# Patient Record
Sex: FEMALE | Race: WHITE | NOT HISPANIC OR LATINO | URBAN - METROPOLITAN AREA
[De-identification: names, ages, dates, MRNs, and addresses within clinical notes are randomized per-mention and may not be internally consistent; named-entity substitution may affect disease eponyms.]

---

## 2022-10-24 VITALS
RESPIRATION RATE: 16 BRPM | DIASTOLIC BLOOD PRESSURE: 76 MMHG | HEART RATE: 88 BPM | OXYGEN SATURATION: 98 % | SYSTOLIC BLOOD PRESSURE: 109 MMHG | WEIGHT: 143.3 LBS | HEIGHT: 67 IN | TEMPERATURE: 98 F

## 2022-10-24 NOTE — H&P ADULT - NSHPPHYSICALEXAM_GEN_ALL_CORE
Gen: 61F, NAD  MSK: Decreased left hip ROM secondary to pain      Rest of PE per MD clearance Gen: 61F, NAD  MSK: Decreased left hip ROM secondary to pain  Skin without erythema, ecchymosis, abrasions or lesions, signs of infection  Calves soft, nontender bilaterally   Sensation intact to light touch bilateral lower extremities  Pulses: DP2+ bilat LE; brisk capillary refill  EHL/FHL/TA/GS 5/5 bilaterally       Rest of PE per MD clearance

## 2022-10-24 NOTE — H&P ADULT - HISTORY OF PRESENT ILLNESS
61F with left hip pain x    Presents for elective left MIKE. 61F with left hip pain x 6 months. She has pain and difficulty with activity. She denies numbness or tingling in the LE. She has tried rest, time, medication, PT without relief.     Presents for elective left MIKE.

## 2022-10-24 NOTE — ASU PATIENT PROFILE, ADULT - NSICDXPASTSURGICALHX_GEN_ALL_CORE_FT
PAST SURGICAL HISTORY:  Status post breast reduction      PAST SURGICAL HISTORY:  H/O total hysterectomy     Status post breast reduction

## 2022-10-24 NOTE — H&P ADULT - PROBLEM SELECTOR PLAN 1
Admit to Orthopedic Service for elective Left MIKE    Medically cleared and optimized for surgery by Dr. De Souza Admit to Orthopedic Service for elective Left MIKE    Medically cleared and optimized for surgery by Dr. De Souza    ATTENDING ADDENDUM Dr. Faust, 11/10/22  62 y/o female here for left total hip arthroplasty  - admit for inpatient stay  - NPO, IV fluids  - IV Ancef, IV TXA, IV decadron pre-op  -  mg PO BID x6 weeks for DVT PPx

## 2022-10-24 NOTE — H&P ADULT - NSICDXPASTMEDICALHX_GEN_ALL_CORE_FT
PAST MEDICAL HISTORY:  History of ovarian cancer     Peripheral neuropathy report s/p COVID     PAST MEDICAL HISTORY:  Endometrial cancer     Peripheral neuropathy report s/p COVID    Tobacco use disorder

## 2022-10-24 NOTE — ASU PATIENT PROFILE, ADULT - FALL HARM RISK - RISK INTERVENTIONS

## 2022-10-24 NOTE — H&P ADULT - NSHPLABSRESULTS_GEN_ALL_CORE
Preop CBC, BMP, PT/INR within normal range and reviewed per medical clearance. PTT DOS  Cr- .57  Preop CXR within normal limits and reviewed per medical clearance   Preop EKG within normal limits and reviewed per medical clearance  3M: DOS  COVID: neg, 10/22

## 2022-10-24 NOTE — ASU PATIENT PROFILE, ADULT - NSICDXPASTMEDICALHX_GEN_ALL_CORE_FT
PAST MEDICAL HISTORY:  History of ovarian cancer     Peripheral neuropathy report s/p COVID     PAST MEDICAL HISTORY:  Endometrial cancer     Peripheral neuropathy report s/p COVID

## 2022-11-10 ENCOUNTER — INPATIENT (INPATIENT)
Facility: HOSPITAL | Age: 62
LOS: 0 days | Discharge: HOME | DRG: 470 | End: 2022-11-11
Attending: ORTHOPAEDIC SURGERY | Admitting: ORTHOPAEDIC SURGERY
Payer: MEDICAID

## 2022-11-10 DIAGNOSIS — G62.9 POLYNEUROPATHY, UNSPECIFIED: ICD-10-CM

## 2022-11-10 DIAGNOSIS — M16.12 UNILATERAL PRIMARY OSTEOARTHRITIS, LEFT HIP: ICD-10-CM

## 2022-11-10 DIAGNOSIS — Z90.710 ACQUIRED ABSENCE OF BOTH CERVIX AND UTERUS: Chronic | ICD-10-CM

## 2022-11-10 DIAGNOSIS — F17.200 NICOTINE DEPENDENCE, UNSPECIFIED, UNCOMPLICATED: ICD-10-CM

## 2022-11-10 DIAGNOSIS — Z85.43 PERSONAL HISTORY OF MALIGNANT NEOPLASM OF OVARY: ICD-10-CM

## 2022-11-10 DIAGNOSIS — Z98.890 OTHER SPECIFIED POSTPROCEDURAL STATES: Chronic | ICD-10-CM

## 2022-11-10 LAB — APTT BLD: 33.8 SEC — SIGNIFICANT CHANGE UP (ref 27.5–35.5)

## 2022-11-10 PROCEDURE — 72170 X-RAY EXAM OF PELVIS: CPT | Mod: 26

## 2022-11-10 DEVICE — NECK ANGLE HIP STEM: Type: IMPLANTABLE DEVICE | Status: FUNCTIONAL

## 2022-11-10 DEVICE — SHELL ACET TRIDENT II D 48MM: Type: IMPLANTABLE DEVICE | Status: FUNCTIONAL

## 2022-11-10 DEVICE — SCREW HEX LO PROF 6.5X15MM: Type: IMPLANTABLE DEVICE | Status: FUNCTIONAL

## 2022-11-10 DEVICE — MAKO BONE PIN 4MM X 110MM: Type: IMPLANTABLE DEVICE | Status: FUNCTIONAL

## 2022-11-10 DEVICE — LINER ACET TRIDENT X3 0 DEG 36MM D: Type: IMPLANTABLE DEVICE | Status: FUNCTIONAL

## 2022-11-10 DEVICE — HEAD DELTA CER V40 36 PLUS 5: Type: IMPLANTABLE DEVICE | Status: FUNCTIONAL

## 2022-11-10 DEVICE — MAKO CHECKPOINT 3.5MM HEX IMPACTION: Type: IMPLANTABLE DEVICE | Status: FUNCTIONAL

## 2022-11-10 DEVICE — SCREW HEX LO PROF 6.5X20MM: Type: IMPLANTABLE DEVICE | Status: FUNCTIONAL

## 2022-11-10 RX ORDER — POLYETHYLENE GLYCOL 3350 17 G/17G
17 POWDER, FOR SOLUTION ORAL DAILY
Refills: 0 | Status: DISCONTINUED | OUTPATIENT
Start: 2022-11-10 | End: 2022-11-11

## 2022-11-10 RX ORDER — HYDROMORPHONE HYDROCHLORIDE 2 MG/ML
0.5 INJECTION INTRAMUSCULAR; INTRAVENOUS; SUBCUTANEOUS
Refills: 0 | Status: DISCONTINUED | OUTPATIENT
Start: 2022-11-10 | End: 2022-11-11

## 2022-11-10 RX ORDER — ACETAMINOPHEN 500 MG
1000 TABLET ORAL EVERY 8 HOURS
Refills: 0 | Status: DISCONTINUED | OUTPATIENT
Start: 2022-11-10 | End: 2022-11-11

## 2022-11-10 RX ORDER — KETOROLAC TROMETHAMINE 30 MG/ML
15 SYRINGE (ML) INJECTION EVERY 8 HOURS
Refills: 0 | Status: DISCONTINUED | OUTPATIENT
Start: 2022-11-10 | End: 2022-11-11

## 2022-11-10 RX ORDER — BENZOCAINE AND MENTHOL 5; 1 G/100ML; G/100ML
1 LIQUID ORAL
Refills: 0 | Status: DISCONTINUED | OUTPATIENT
Start: 2022-11-10 | End: 2022-11-11

## 2022-11-10 RX ORDER — OXYCODONE HYDROCHLORIDE 5 MG/1
10 TABLET ORAL EVERY 4 HOURS
Refills: 0 | Status: DISCONTINUED | OUTPATIENT
Start: 2022-11-10 | End: 2022-11-11

## 2022-11-10 RX ORDER — CHLORHEXIDINE GLUCONATE 213 G/1000ML
1 SOLUTION TOPICAL ONCE
Refills: 0 | Status: COMPLETED | OUTPATIENT
Start: 2022-11-10 | End: 2022-11-10

## 2022-11-10 RX ORDER — SENNA PLUS 8.6 MG/1
2 TABLET ORAL AT BEDTIME
Refills: 0 | Status: DISCONTINUED | OUTPATIENT
Start: 2022-11-10 | End: 2022-11-11

## 2022-11-10 RX ORDER — MUPIROCIN 20 MG/G
1 OINTMENT TOPICAL
Refills: 0 | Status: DISCONTINUED | OUTPATIENT
Start: 2022-11-10 | End: 2022-11-10

## 2022-11-10 RX ORDER — PANTOPRAZOLE SODIUM 20 MG/1
40 TABLET, DELAYED RELEASE ORAL
Refills: 0 | Status: DISCONTINUED | OUTPATIENT
Start: 2022-11-10 | End: 2022-11-11

## 2022-11-10 RX ORDER — CELECOXIB 200 MG/1
200 CAPSULE ORAL ONCE
Refills: 0 | Status: COMPLETED | OUTPATIENT
Start: 2022-11-10 | End: 2022-11-10

## 2022-11-10 RX ORDER — SODIUM CHLORIDE 9 MG/ML
1000 INJECTION, SOLUTION INTRAVENOUS
Refills: 0 | Status: DISCONTINUED | OUTPATIENT
Start: 2022-11-10 | End: 2022-11-11

## 2022-11-10 RX ORDER — GABAPENTIN 400 MG/1
300 CAPSULE ORAL THREE TIMES A DAY
Refills: 0 | Status: DISCONTINUED | OUTPATIENT
Start: 2022-11-10 | End: 2022-11-11

## 2022-11-10 RX ORDER — LANOLIN ALCOHOL/MO/W.PET/CERES
5 CREAM (GRAM) TOPICAL AT BEDTIME
Refills: 0 | Status: DISCONTINUED | OUTPATIENT
Start: 2022-11-10 | End: 2022-11-11

## 2022-11-10 RX ORDER — ONDANSETRON 8 MG/1
4 TABLET, FILM COATED ORAL EVERY 6 HOURS
Refills: 0 | Status: DISCONTINUED | OUTPATIENT
Start: 2022-11-10 | End: 2022-11-11

## 2022-11-10 RX ORDER — MUPIROCIN 20 MG/G
1 OINTMENT TOPICAL
Refills: 0 | Status: DISCONTINUED | OUTPATIENT
Start: 2022-11-10 | End: 2022-11-11

## 2022-11-10 RX ORDER — OXYCODONE HYDROCHLORIDE 5 MG/1
5 TABLET ORAL EVERY 4 HOURS
Refills: 0 | Status: DISCONTINUED | OUTPATIENT
Start: 2022-11-10 | End: 2022-11-11

## 2022-11-10 RX ORDER — ACETAMINOPHEN 500 MG
1000 TABLET ORAL ONCE
Refills: 0 | Status: COMPLETED | OUTPATIENT
Start: 2022-11-10 | End: 2022-11-10

## 2022-11-10 RX ORDER — CELECOXIB 200 MG/1
200 CAPSULE ORAL DAILY
Refills: 0 | Status: DISCONTINUED | OUTPATIENT
Start: 2022-11-11 | End: 2022-11-11

## 2022-11-10 RX ORDER — DEXAMETHASONE 0.5 MG/5ML
10 ELIXIR ORAL EVERY 8 HOURS
Refills: 0 | Status: COMPLETED | OUTPATIENT
Start: 2022-11-10 | End: 2022-11-11

## 2022-11-10 RX ORDER — ASPIRIN/CALCIUM CARB/MAGNESIUM 324 MG
325 TABLET ORAL
Refills: 0 | Status: DISCONTINUED | OUTPATIENT
Start: 2022-11-11 | End: 2022-11-11

## 2022-11-10 RX ORDER — CEFAZOLIN SODIUM 1 G
2000 VIAL (EA) INJECTION EVERY 8 HOURS
Refills: 0 | Status: COMPLETED | OUTPATIENT
Start: 2022-11-10 | End: 2022-11-11

## 2022-11-10 RX ADMIN — ONDANSETRON 4 MILLIGRAM(S): 8 TABLET, FILM COATED ORAL at 22:05

## 2022-11-10 RX ADMIN — Medication 1000 MILLIGRAM(S): at 23:00

## 2022-11-10 RX ADMIN — HYDROMORPHONE HYDROCHLORIDE 0.5 MILLIGRAM(S): 2 INJECTION INTRAMUSCULAR; INTRAVENOUS; SUBCUTANEOUS at 17:00

## 2022-11-10 RX ADMIN — Medication 1000 MILLIGRAM(S): at 22:00

## 2022-11-10 RX ADMIN — HYDROMORPHONE HYDROCHLORIDE 0.5 MILLIGRAM(S): 2 INJECTION INTRAMUSCULAR; INTRAVENOUS; SUBCUTANEOUS at 18:05

## 2022-11-10 RX ADMIN — OXYCODONE HYDROCHLORIDE 10 MILLIGRAM(S): 5 TABLET ORAL at 19:26

## 2022-11-10 RX ADMIN — Medication 100 MILLIGRAM(S): at 18:33

## 2022-11-10 RX ADMIN — OXYCODONE HYDROCHLORIDE 10 MILLIGRAM(S): 5 TABLET ORAL at 18:55

## 2022-11-10 RX ADMIN — Medication 1000 MILLIGRAM(S): at 10:48

## 2022-11-10 RX ADMIN — OXYCODONE HYDROCHLORIDE 10 MILLIGRAM(S): 5 TABLET ORAL at 23:31

## 2022-11-10 RX ADMIN — MUPIROCIN 1 APPLICATION(S): 20 OINTMENT TOPICAL at 11:10

## 2022-11-10 RX ADMIN — CELECOXIB 200 MILLIGRAM(S): 200 CAPSULE ORAL at 10:48

## 2022-11-10 RX ADMIN — CHLORHEXIDINE GLUCONATE 1 APPLICATION(S): 213 SOLUTION TOPICAL at 11:09

## 2022-11-10 RX ADMIN — HYDROMORPHONE HYDROCHLORIDE 0.5 MILLIGRAM(S): 2 INJECTION INTRAMUSCULAR; INTRAVENOUS; SUBCUTANEOUS at 17:26

## 2022-11-10 RX ADMIN — HYDROMORPHONE HYDROCHLORIDE 0.5 MILLIGRAM(S): 2 INJECTION INTRAMUSCULAR; INTRAVENOUS; SUBCUTANEOUS at 16:38

## 2022-11-10 RX ADMIN — GABAPENTIN 300 MILLIGRAM(S): 400 CAPSULE ORAL at 22:00

## 2022-11-10 RX ADMIN — HYDROMORPHONE HYDROCHLORIDE 0.5 MILLIGRAM(S): 2 INJECTION INTRAMUSCULAR; INTRAVENOUS; SUBCUTANEOUS at 18:19

## 2022-11-10 RX ADMIN — CELECOXIB 200 MILLIGRAM(S): 200 CAPSULE ORAL at 11:11

## 2022-11-10 NOTE — BRIEF OPERATIVE NOTE - COMMENTS
Operative findings:  1. right hip severe degenerative osteoarthritis -- multiple, small acetabular anterosuperior cysts, 2x4 mm  2. femoral neck cut 10 mm, corrected LTC of 57 mm (femoral head size 47 mm)  3. final navigated cup angle of 42 degrees abduction, 22 degrees anteversion -- lengthened the left lower extremity 11 mm (3 mm longer than the contralateral side)  4. combine anteversion of 45 degrees    Operative implants -- [place] II size 48 mm acetabular cup, size 4 femoral stem w extended offset, 36 +5 femoral head, 2x 6.5 mm acetabular screw (15, 20 mm)

## 2022-11-10 NOTE — PRE-ANESTHESIA EVALUATION ADULT - NSANTHOSAYNRD_GEN_A_CORE
No. MEERA screening performed.  STOP BANG Legend: 0-2 = LOW Risk; 3-4 = INTERMEDIATE Risk; 5-8 = HIGH Risk

## 2022-11-11 VITALS
SYSTOLIC BLOOD PRESSURE: 108 MMHG | HEART RATE: 85 BPM | DIASTOLIC BLOOD PRESSURE: 69 MMHG | RESPIRATION RATE: 17 BRPM | OXYGEN SATURATION: 95 % | TEMPERATURE: 98 F

## 2022-11-11 LAB
ANION GAP SERPL CALC-SCNC: 8 MMOL/L — SIGNIFICANT CHANGE UP (ref 5–17)
BUN SERPL-MCNC: 7 MG/DL — SIGNIFICANT CHANGE UP (ref 7–23)
CALCIUM SERPL-MCNC: 8.5 MG/DL — SIGNIFICANT CHANGE UP (ref 8.4–10.5)
CHLORIDE SERPL-SCNC: 104 MMOL/L — SIGNIFICANT CHANGE UP (ref 96–108)
CO2 SERPL-SCNC: 27 MMOL/L — SIGNIFICANT CHANGE UP (ref 22–31)
CREAT SERPL-MCNC: 0.56 MG/DL — SIGNIFICANT CHANGE UP (ref 0.5–1.3)
EGFR: 104 ML/MIN/1.73M2 — SIGNIFICANT CHANGE UP
GLUCOSE SERPL-MCNC: 102 MG/DL — HIGH (ref 70–99)
HCT VFR BLD CALC: 30.8 % — LOW (ref 34.5–45)
HCV AB S/CO SERPL IA: 0.04 S/CO — SIGNIFICANT CHANGE UP
HCV AB SERPL-IMP: SIGNIFICANT CHANGE UP
HGB BLD-MCNC: 9.9 G/DL — LOW (ref 11.5–15.5)
MCHC RBC-ENTMCNC: 30.6 PG — SIGNIFICANT CHANGE UP (ref 27–34)
MCHC RBC-ENTMCNC: 32.1 GM/DL — SIGNIFICANT CHANGE UP (ref 32–36)
MCV RBC AUTO: 95.1 FL — SIGNIFICANT CHANGE UP (ref 80–100)
NRBC # BLD: 0 /100 WBCS — SIGNIFICANT CHANGE UP (ref 0–0)
PLATELET # BLD AUTO: 197 K/UL — SIGNIFICANT CHANGE UP (ref 150–400)
POTASSIUM SERPL-MCNC: 4.2 MMOL/L — SIGNIFICANT CHANGE UP (ref 3.5–5.3)
POTASSIUM SERPL-SCNC: 4.2 MMOL/L — SIGNIFICANT CHANGE UP (ref 3.5–5.3)
RBC # BLD: 3.24 M/UL — LOW (ref 3.8–5.2)
RBC # FLD: 12.7 % — SIGNIFICANT CHANGE UP (ref 10.3–14.5)
SODIUM SERPL-SCNC: 139 MMOL/L — SIGNIFICANT CHANGE UP (ref 135–145)
WBC # BLD: 7.57 K/UL — SIGNIFICANT CHANGE UP (ref 3.8–10.5)
WBC # FLD AUTO: 7.57 K/UL — SIGNIFICANT CHANGE UP (ref 3.8–10.5)

## 2022-11-11 RX ORDER — GABAPENTIN 400 MG/1
1 CAPSULE ORAL
Qty: 0 | Refills: 0 | DISCHARGE

## 2022-11-11 RX ADMIN — OXYCODONE HYDROCHLORIDE 10 MILLIGRAM(S): 5 TABLET ORAL at 18:32

## 2022-11-11 RX ADMIN — Medication 1000 MILLIGRAM(S): at 13:09

## 2022-11-11 RX ADMIN — Medication 1000 MILLIGRAM(S): at 05:17

## 2022-11-11 RX ADMIN — OXYCODONE HYDROCHLORIDE 10 MILLIGRAM(S): 5 TABLET ORAL at 05:17

## 2022-11-11 RX ADMIN — OXYCODONE HYDROCHLORIDE 10 MILLIGRAM(S): 5 TABLET ORAL at 11:20

## 2022-11-11 RX ADMIN — GABAPENTIN 300 MILLIGRAM(S): 400 CAPSULE ORAL at 05:18

## 2022-11-11 RX ADMIN — MUPIROCIN 1 APPLICATION(S): 20 OINTMENT TOPICAL at 17:33

## 2022-11-11 RX ADMIN — Medication 1000 MILLIGRAM(S): at 06:17

## 2022-11-11 RX ADMIN — OXYCODONE HYDROCHLORIDE 10 MILLIGRAM(S): 5 TABLET ORAL at 06:17

## 2022-11-11 RX ADMIN — Medication 325 MILLIGRAM(S): at 17:33

## 2022-11-11 RX ADMIN — Medication 325 MILLIGRAM(S): at 05:19

## 2022-11-11 RX ADMIN — OXYCODONE HYDROCHLORIDE 10 MILLIGRAM(S): 5 TABLET ORAL at 10:46

## 2022-11-11 RX ADMIN — OXYCODONE HYDROCHLORIDE 10 MILLIGRAM(S): 5 TABLET ORAL at 17:33

## 2022-11-11 RX ADMIN — Medication 1000 MILLIGRAM(S): at 14:00

## 2022-11-11 RX ADMIN — Medication 100 MILLIGRAM(S): at 05:40

## 2022-11-11 NOTE — DISCHARGE NOTE PROVIDER - NSDCFUADDINST_GEN_ALL_CORE_FT
Weight bear as tolerated with assistive device. Follow posterior hip precautions as taught to you in the hospital.  No strenuous activity, heavy lifting, driving or returning to work until cleared by MD.  You may shower - dressing is water-resistant, no soaking in bathtubs.  Remove dressing after post op day 7, then leave incision open to air. Keep incision clean and dry.  Try to have regular bowel movements, take stool softener or laxative if necessary.      Swelling may travel all the way down leg to foot, this is normal and will subside in a few weeks.  Call to schedule an appt with Dr. Faust for follow up, if you have staples or sutures they will be removed in office.  Contact your doctor if you experience: fever greater than 101.5, chills, chest pain, difficulty breathing, redness or excessive drainage around the incision, other concerns.  Follow up with your primary care provider.   Weight bear as tolerated with assistive device. Follow posterior hip precautions as taught to you in the hospital.  No strenuous activity, heavy lifting, driving or returning to work until cleared by MD.  You may shower - dressing is water-resistant, no soaking in bathtubs.  Remove dressing after post op day 7, then leave incision open to air. Keep incision clean and dry.  Try to have regular bowel movements, take stool softener or laxative if necessary.      Swelling may travel all the way down leg to foot, this is normal and will subside in a few weeks.  Call to schedule an appt with Dr. Faust for follow up, if you have staples or sutures they will be removed in office.  Contact your doctor if you experience: fever greater than 101.5, chills, chest pain, difficulty breathing, redness or excessive drainage around the incision, other concerns.  Follow up with your primary care provider.    Please take all medications as prescribed by Dr. Faust

## 2022-11-11 NOTE — OCCUPATIONAL THERAPY INITIAL EVALUATION ADULT - PHYSICAL ASSIST/NONPHYSICAL ASSIST: SCOOT/BRIDGE, REHAB EVAL
Try an over-the-counter supplement called FDgard for your stomach.  Let us know if this does not help.   supervision

## 2022-11-11 NOTE — DISCHARGE NOTE PROVIDER - NSDCCPTREATMENT_GEN_ALL_CORE_FT
PRINCIPAL PROCEDURE  Procedure: Total replacement of left hip using TA  Findings and Treatment:

## 2022-11-11 NOTE — PHYSICAL THERAPY INITIAL EVALUATION ADULT - ADDITIONAL COMMENTS
Pt resides in building with elevator with 4 BARBARA, has RW, cane and raised toilet seat. States sister is available to assist with ADL.

## 2022-11-11 NOTE — OCCUPATIONAL THERAPY INITIAL EVALUATION ADULT - RANGE OF MOTION EXAMINATION, LOWER EXTREMITY
RLE AROm, PROM at WFL, WNL; L hip and knee AROM limited (2/2 pain); ankle AROM, PROM at WFL, WNL RLE AROM, PROM at WFL, WNL; L hip and knee AROM limited (2/2 pain); ankle AROM, PROM at WFL, WNL

## 2022-11-11 NOTE — DISCHARGE NOTE NURSING/CASE MANAGEMENT/SOCIAL WORK - NSDCPEFALRISK_GEN_ALL_CORE
For information on Fall & Injury Prevention, visit: [email or web address] OR  [email or web address] OR  https://www.cdc.gov/steadi/patient.html

## 2022-11-11 NOTE — OCCUPATIONAL THERAPY INITIAL EVALUATION ADULT - ADDITIONAL COMMENTS
Pt lives w/ her sister in ground floor apt w/ ~4 stairs to enter building. Pt states that she was independent prior to admission. No prior use/possession of AEs/DMEs per Pt. Pt lives w/ her sister in ground floor apt w/ ~4 stairs to enter building. Pt states that she was independent prior to admission. Pt states that she has a cane, and raised toilet seat.

## 2022-11-11 NOTE — PROGRESS NOTE ADULT - SUBJECTIVE AND OBJECTIVE BOX
Ortho Note    Pt seen and examined this AM. Pain moderately controlled, but otherwise denies complaints.  Denies CP, SOB, N/V, numbness/tingling.     Vital Signs Last 24 Hrs  T(C): --  T(F): --  HR: 78 (11-11-22 @ 10:00) (76 - 78)  BP: 101/65 (11-11-22 @ 10:00) (101/65 - 107/70)  BP(mean): --  RR: --  SpO2: 97% (11-11-22 @ 09:20) (97% - 97%)  AVSS    General: Pt Alert and oriented, NAD  DSG- aquacel C/D/I  Pulses: +2DP, WWP feet  Sensation: SILT BLE  Motor: 5/5 EHL/FHL/TA/GS                          9.9    7.57  )-----------( 197      ( 11 Nov 2022 10:00 )             30.8     11-11    139  |  104  |  7   ----------------------------<  102<H>  4.2   |  27  |  0.56    Ca    8.5      11 Nov 2022 10:00        A/P: 61yFemale POD#1 s/p left total hip replacement, posterior approach  - VSS, labs WNL  - Pain Control  - DVT ppx: ASA 325mg bid x 6 weeks  - PT, WBS: WBAT, posterior precautions  - OOB for meals, I/S  - bowel regimen  - dispo: home with HPT pending PT clearance    Ortho Pager [phone]
Ortho Post Op Check    Procedure: L MIKE  Surgeon: Faust    Pt comfortable without complaints, pain controlled  Denies CP, SOB, N/V, numbness/tingling     Vital Signs Last 24 Hrs  T(C): 36.1 (11-10-22 @ 16:20), Max: 36.1 (11-10-22 @ 16:20)  T(F): 96.9 (11-10-22 @ 16:20), Max: 96.9 (11-10-22 @ 16:20)  HR: 52 (11-10-22 @ 19:31) (52 - 88)  BP: 114/68 (11-10-22 @ 19:31) (105/57 - 114/70)  BP(mean): 82 (11-10-22 @ 19:00) (76 - 88)  RR: 11 (11-10-22 @ 19:31) (10 - 21)  SpO2: 99% (11-10-22 @ 19:31) (93% - 100%)  I&O's Summary      Physical Exam:  General: Pt Alert and oriented, NAD  L Hip with aquacell DSG C/D/I  Pulses: 2+ dp, pt pulses, toes wwp, cap refill <3 sec  Sensation: SILT in sural/saph/sp/dp/tibial distributions b/l LE  Motor: EHL/FHL/TA/GS  firing equally b/l LE              Post-op X-Ray:  Xray L Hip: Hardware in place in appropriate alignment, no intra-op fx noted.    A/P: 61yFemale POD#0 s/p L IMKE, on 11/10  - Stable  - Pain Control  - f/u AM labs  - DVT ppx:   - Post op abx: periop ancef  - PT, WBS: WBAT  - Dispo: pending PT    Ceferino Marie, PGY-2  Ortho Pager [phone]
S: Patient seen and examined.  Had oxycodone 10 mg PO earlier this morning.  Responds appropriately.  No apparent distress    O:  L hip: mild strikethrough ASIS dressing, otherwise Aquacell c/d/i; 5/5/ EHL/FHL/GA/TA, denies sensory deficits

## 2022-11-11 NOTE — OCCUPATIONAL THERAPY INITIAL EVALUATION ADULT - GENERAL OBSERVATIONS, REHAB EVAL
Pt's RN Laura aware of intent to eval/tx; cleared Pt. Pt received in supine - +heplock, abductor pillow, L hip surgical dressing intact, b/l scds. Pt agreeable to OT

## 2022-11-11 NOTE — OCCUPATIONAL THERAPY INITIAL EVALUATION ADULT - PERSONAL SAFETY AND JUDGMENT, REHAB EVAL
impulsive!, requires cues/reminders to increase safety awareness w/ functional activities. Pt educated on LLE WBAT and posterior hip precs.

## 2022-11-11 NOTE — DISCHARGE NOTE NURSING/CASE MANAGEMENT/SOCIAL WORK - PATIENT PORTAL LINK FT
You can access the FollowMyHealth Patient Portal offered by [facility] by registering at the following website: [email or web address]. By joining [facility]’s FollowMyHealth portal, you will also be able to view your health information using other applications (apps) compatible with our system.

## 2022-11-11 NOTE — DISCHARGE NOTE PROVIDER - CARE PROVIDER_API CALL
Jarocho Faust)  Orthopaedic Surgery  [address] [address]  Phone: [phone]  Fax: [phone]  Follow Up Time: 2 weeks

## 2022-11-11 NOTE — OCCUPATIONAL THERAPY INITIAL EVALUATION ADULT - PERTINENT HX OF CURRENT PROBLEM, REHAB EVAL
61F with left hip pain x 6 months. She has pain and difficulty with activity. She denies numbness or tingling in the LE. She has tried rest, time, medication, PT without relief.

## 2022-11-11 NOTE — PROGRESS NOTE ADULT - PROBLEM SELECTOR PLAN 1
A/P: s/p L MIKE, with TA on POD #1  - WBAT L LE, posterior hip precautions  - DVT PPx:  mg PO bid x6 weeks  - pain control  - f/u AM labs  - dressing -- remove ASIS dressing tomorrow  - dispo -- pending progressing with PT, today versus tomorrow

## 2022-11-11 NOTE — CHART NOTE - NSCHARTNOTEFT_GEN_A_CORE
Patient refusing schedule PM vitals and "does not want to be disturbed until 10am". Along side DEYANIRA Estes, patient was advised about the need for vital checks epecially in the immediate perioperative period, but despite counseling, patient continues to refuse vitals. Patient refusing schedule PM vitals and "does not want to be disturbed until 10am". Along side DEYANIRA Estes, patient was advised about the need for vital checks especially in the immediate perioperative period, but despite counseling, patient continues to refuse vitals and PM meds.

## 2022-11-11 NOTE — DISCHARGE NOTE PROVIDER - HOSPITAL COURSE
Admitted 11/10/22  Surgery- left total hip replacement, posterior approach  Arely-op Antibiotics  Pain control  DVT prophylaxis  OOB/Physical Therapy

## 2022-11-11 NOTE — DISCHARGE NOTE PROVIDER - NSDCMRMEDTOKEN_GEN_ALL_CORE_FT
gabapentin 300 mg oral capsule: 1 cap(s) orally 3 times a day  naproxen 500 mg oral tablet: 1 tab(s) orally 2 times a day, As Needed

## 2022-11-11 NOTE — OCCUPATIONAL THERAPY INITIAL EVALUATION ADULT - MD ORDER
Left Hip pain 2/2 Severe degenerative Osteoarthritis  Now s/p Total replacement of left hip on 11/10/22

## 2022-11-11 NOTE — PHYSICAL THERAPY INITIAL EVALUATION ADULT - GENERAL OBSERVATIONS, REHAB EVAL
Pt received in bedin no acute distress, +abduction wedge, SCD, heplock. Pt. presents with decreased compliance with post-op precautions. Will need follow up.

## 2022-11-14 DIAGNOSIS — G62.9 POLYNEUROPATHY, UNSPECIFIED: ICD-10-CM

## 2022-11-14 DIAGNOSIS — M16.12 UNILATERAL PRIMARY OSTEOARTHRITIS, LEFT HIP: ICD-10-CM

## 2022-11-14 DIAGNOSIS — F17.200 NICOTINE DEPENDENCE, UNSPECIFIED, UNCOMPLICATED: ICD-10-CM

## 2023-03-11 PROBLEM — G62.9 POLYNEUROPATHY, UNSPECIFIED: Chronic | Status: ACTIVE | Noted: 2022-10-24

## 2023-03-11 PROBLEM — C54.1 MALIGNANT NEOPLASM OF ENDOMETRIUM: Chronic | Status: ACTIVE | Noted: 2022-11-09

## 2023-03-11 PROBLEM — F17.200 NICOTINE DEPENDENCE, UNSPECIFIED, UNCOMPLICATED: Chronic | Status: ACTIVE | Noted: 2022-11-10

## 2023-03-17 VITALS
HEART RATE: 85 BPM | TEMPERATURE: 98 F | DIASTOLIC BLOOD PRESSURE: 64 MMHG | SYSTOLIC BLOOD PRESSURE: 101 MMHG | WEIGHT: 293 LBS | RESPIRATION RATE: 16 BRPM | HEIGHT: 67.5 IN | OXYGEN SATURATION: 97 %

## 2023-03-17 NOTE — H&P ADULT - NSICDXPASTMEDICALHX_GEN_ALL_CORE_FT
PAST MEDICAL HISTORY:  Endometrial cancer     Peripheral neuropathy report s/p COVID    Tobacco use disorder      PAST MEDICAL HISTORY:  Endometrial cancer     OA (osteoarthritis)     Peripheral neuropathy report s/p COVID    Tobacco use disorder

## 2023-03-17 NOTE — H&P ADULT - HISTORY OF PRESENT ILLNESS
63yo F w/ right hip pain x    On ASA 325mg BID     Presents today for elective Right THR w/ Dr. Faust 61yo F w/ right hip pain x chronic. Patient notes stiffness, limited ROM, pain with ambulation at the right hip. Endorses failure of conservative management including oral analgesics, activity modification. Patient denies recent hospitalization, use of antibiotics.   Presents today for elective Right THR w/ Dr. Faust 61yo F w/ right hip pain x chronic. Patient notes stiffness, limited ROM, pain with ambulation at the right hip. Endorses failure of conservative management including oral analgesics, activity modification. Patient underwent left MIKE in November 2022. Currently using a cane for ambulation. Patient denies recent hospitalization, use of antibiotics since prior MIKE. Denies h/o bloot clots, current use of antibiotics.   Presents today for elective Right THR w/ Dr. Faust

## 2023-03-17 NOTE — ASU PATIENT PROFILE, ADULT - FALL HARM RISK - RISK INTERVENTIONS

## 2023-03-17 NOTE — H&P ADULT - NSHPPHYSICALEXAM_GEN_ALL_CORE
Gen: Alert and oriented, NAD  MSK: Decreased ROM to Right hip 2/2 pain    Rest of PE per medical clearance note Gen: Alert and oriented, NAD  MSK: Decreased ROM to Right hip 2/2 pain    Gen: NAD  MSK: decreased ROM 2/2 pain at the   Motor: quad/TA/GS/EHL/FHl 5/5 bilateral lower extremities  Sensation: intact to light touch throughout bilateral lower extremities  Pulses: 2+ DP pulses bilaterally, extremities warm and well perfused, capillary refill brisk     Remainder of exam per medical clearance note

## 2023-03-17 NOTE — ASU PATIENT PROFILE, ADULT - NSICDXPASTMEDICALHX_GEN_ALL_CORE_FT
PAST MEDICAL HISTORY:  Endometrial cancer     OA (osteoarthritis)     Peripheral neuropathy report s/p COVID    Tobacco use disorder

## 2023-03-17 NOTE — H&P ADULT - NSHPLABSRESULTS_GEN_ALL_CORE
Preop CBC, BMP, PT/PTT/INR, within normal limits- reviewed by medical clearance.  Cr: 0.65  Preop EKG: NSR, HR 88bpm reviewed by medical clearance.  CXR: Within normal limits, per medical clearance.  COVID: ____  3M DOS Preop CBC, BMP, PT/PTT/INR, within normal limits- reviewed by medical clearance.  Cr: 0.65  Preop EKG: NSR, HR 88bpm reviewed by medical clearance.  CXR: Within normal limits, per medical clearance.  COVID: -3/18  3M DOS

## 2023-03-17 NOTE — H&P ADULT - PROBLEM SELECTOR PLAN 1
Admit to Orthopaedic Service.  Presents today for elective Right THR  Pt medically stable and cleared for procedure today by Dr. De Souza Admit to Orthopaedic Service.  Presents today for elective Right THR  Pt medically stable and cleared for procedure today by Dr. De Souza    ATTENDING ADDENDUM, Dr. Faust  Agree with above.  For right total hip replacement  - NPO, IV fluids  - IV TXA, IV Ancef, IV Decadron to OR  -  mg PO bid x6 weeks for DVT PPx

## 2023-03-17 NOTE — ASU PATIENT PROFILE, ADULT - NSICDXPASTSURGICALHX_GEN_ALL_CORE_FT
PAST SURGICAL HISTORY:  H/O total hysterectomy     S/P hip replacement, left     Status post breast reduction

## 2023-03-17 NOTE — H&P ADULT - NSICDXPASTSURGICALHX_GEN_ALL_CORE_FT
PAST SURGICAL HISTORY:  H/O total hysterectomy     Status post breast reduction      PAST SURGICAL HISTORY:  H/O total hysterectomy     S/P hip replacement, left     Status post breast reduction

## 2023-03-20 ENCOUNTER — INPATIENT (INPATIENT)
Facility: HOSPITAL | Age: 63
LOS: 1 days | Discharge: HOME | DRG: 470 | End: 2023-03-22
Attending: ORTHOPAEDIC SURGERY | Admitting: ORTHOPAEDIC SURGERY
Payer: MEDICAID

## 2023-03-20 DIAGNOSIS — Z98.890 OTHER SPECIFIED POSTPROCEDURAL STATES: Chronic | ICD-10-CM

## 2023-03-20 DIAGNOSIS — M16.11 UNILATERAL PRIMARY OSTEOARTHRITIS, RIGHT HIP: ICD-10-CM

## 2023-03-20 DIAGNOSIS — C54.1 MALIGNANT NEOPLASM OF ENDOMETRIUM: ICD-10-CM

## 2023-03-20 DIAGNOSIS — Z96.642 PRESENCE OF LEFT ARTIFICIAL HIP JOINT: Chronic | ICD-10-CM

## 2023-03-20 DIAGNOSIS — G62.9 POLYNEUROPATHY, UNSPECIFIED: ICD-10-CM

## 2023-03-20 DIAGNOSIS — Z90.710 ACQUIRED ABSENCE OF BOTH CERVIX AND UTERUS: Chronic | ICD-10-CM

## 2023-03-20 PROCEDURE — 85730 THROMBOPLASTIN TIME PARTIAL: CPT

## 2023-03-20 PROCEDURE — C1713: CPT

## 2023-03-20 PROCEDURE — 85027 COMPLETE CBC AUTOMATED: CPT

## 2023-03-20 PROCEDURE — 97162 PT EVAL MOD COMPLEX 30 MIN: CPT

## 2023-03-20 PROCEDURE — 86900 BLOOD TYPING SEROLOGIC ABO: CPT

## 2023-03-20 PROCEDURE — 72170 X-RAY EXAM OF PELVIS: CPT

## 2023-03-20 PROCEDURE — 97161 PT EVAL LOW COMPLEX 20 MIN: CPT

## 2023-03-20 PROCEDURE — C1776: CPT

## 2023-03-20 PROCEDURE — 88300 SURGICAL PATH GROSS: CPT | Mod: 26

## 2023-03-20 PROCEDURE — 97535 SELF CARE MNGMENT TRAINING: CPT

## 2023-03-20 PROCEDURE — 86850 RBC ANTIBODY SCREEN: CPT

## 2023-03-20 PROCEDURE — 36415 COLL VENOUS BLD VENIPUNCTURE: CPT

## 2023-03-20 PROCEDURE — S2900: CPT

## 2023-03-20 PROCEDURE — C1889: CPT

## 2023-03-20 PROCEDURE — 86803 HEPATITIS C AB TEST: CPT

## 2023-03-20 PROCEDURE — 86901 BLOOD TYPING SEROLOGIC RH(D): CPT

## 2023-03-20 PROCEDURE — 80048 BASIC METABOLIC PNL TOTAL CA: CPT

## 2023-03-20 PROCEDURE — 72170 X-RAY EXAM OF PELVIS: CPT | Mod: 26

## 2023-03-20 DEVICE — MAKO BONE PIN 4MM X 170MM: Type: IMPLANTABLE DEVICE | Status: FUNCTIONAL

## 2023-03-20 DEVICE — MAKO CHECKPOINT 3.5MM HEX IMPACTION: Type: IMPLANTABLE DEVICE | Status: FUNCTIONAL

## 2023-03-20 DEVICE — HEAD DELTA CER V40 36 PLUS 5: Type: IMPLANTABLE DEVICE | Status: FUNCTIONAL

## 2023-03-20 DEVICE — NECK ANGLE HIP STEM: Type: IMPLANTABLE DEVICE | Status: FUNCTIONAL

## 2023-03-20 DEVICE — LINER ACET TRIDENT X3 0 DEG 36MM D: Type: IMPLANTABLE DEVICE | Status: FUNCTIONAL

## 2023-03-20 DEVICE — SHELL ACET TRIDENT II D 48MM: Type: IMPLANTABLE DEVICE | Status: FUNCTIONAL

## 2023-03-20 DEVICE — SCREW HEX LO PROF 6.5X30MM: Type: IMPLANTABLE DEVICE | Status: FUNCTIONAL

## 2023-03-20 DEVICE — SCREW HEX LO PROF 6.5X20MM: Type: IMPLANTABLE DEVICE | Status: FUNCTIONAL

## 2023-03-20 RX ORDER — GABAPENTIN 400 MG/1
300 CAPSULE ORAL EVERY 12 HOURS
Refills: 0 | Status: DISCONTINUED | OUTPATIENT
Start: 2023-03-20 | End: 2023-03-22

## 2023-03-20 RX ORDER — TRAMADOL HYDROCHLORIDE 50 MG/1
100 TABLET ORAL EVERY 6 HOURS
Refills: 0 | Status: DISCONTINUED | OUTPATIENT
Start: 2023-03-20 | End: 2023-03-22

## 2023-03-20 RX ORDER — DEXAMETHASONE 0.5 MG/5ML
10 ELIXIR ORAL EVERY 8 HOURS
Refills: 0 | Status: COMPLETED | OUTPATIENT
Start: 2023-03-21 | End: 2023-03-21

## 2023-03-20 RX ORDER — ACETAMINOPHEN 500 MG
1000 TABLET ORAL ONCE
Refills: 0 | Status: COMPLETED | OUTPATIENT
Start: 2023-03-20 | End: 2023-03-20

## 2023-03-20 RX ORDER — MORPHINE SULFATE 50 MG/1
2 CAPSULE, EXTENDED RELEASE ORAL EVERY 4 HOURS
Refills: 0 | Status: DISCONTINUED | OUTPATIENT
Start: 2023-03-20 | End: 2023-03-20

## 2023-03-20 RX ORDER — SODIUM CHLORIDE 9 MG/ML
1000 INJECTION, SOLUTION INTRAVENOUS
Refills: 0 | Status: DISCONTINUED | OUTPATIENT
Start: 2023-03-21 | End: 2023-03-21

## 2023-03-20 RX ORDER — ACETAMINOPHEN 500 MG
975 TABLET ORAL EVERY 8 HOURS
Refills: 0 | Status: DISCONTINUED | OUTPATIENT
Start: 2023-03-21 | End: 2023-03-22

## 2023-03-20 RX ORDER — POLYETHYLENE GLYCOL 3350 17 G/17G
17 POWDER, FOR SOLUTION ORAL AT BEDTIME
Refills: 0 | Status: DISCONTINUED | OUTPATIENT
Start: 2023-03-20 | End: 2023-03-22

## 2023-03-20 RX ORDER — MORPHINE SULFATE 50 MG/1
2 CAPSULE, EXTENDED RELEASE ORAL EVERY 4 HOURS
Refills: 0 | Status: DISCONTINUED | OUTPATIENT
Start: 2023-03-20 | End: 2023-03-22

## 2023-03-20 RX ORDER — CHLORHEXIDINE GLUCONATE 213 G/1000ML
1 SOLUTION TOPICAL ONCE
Refills: 0 | Status: COMPLETED | OUTPATIENT
Start: 2023-03-20 | End: 2023-03-20

## 2023-03-20 RX ORDER — KETOROLAC TROMETHAMINE 30 MG/ML
15 SYRINGE (ML) INJECTION EVERY 8 HOURS
Refills: 0 | Status: DISCONTINUED | OUTPATIENT
Start: 2023-03-21 | End: 2023-03-21

## 2023-03-20 RX ORDER — CELECOXIB 200 MG/1
200 CAPSULE ORAL DAILY
Refills: 0 | Status: DISCONTINUED | OUTPATIENT
Start: 2023-03-20 | End: 2023-03-22

## 2023-03-20 RX ORDER — TRAMADOL HYDROCHLORIDE 50 MG/1
50 TABLET ORAL EVERY 6 HOURS
Refills: 0 | Status: DISCONTINUED | OUTPATIENT
Start: 2023-03-20 | End: 2023-03-22

## 2023-03-20 RX ORDER — ASPIRIN/CALCIUM CARB/MAGNESIUM 324 MG
325 TABLET ORAL
Refills: 0 | Status: DISCONTINUED | OUTPATIENT
Start: 2023-03-20 | End: 2023-03-22

## 2023-03-20 RX ORDER — MAGNESIUM HYDROXIDE 400 MG/1
30 TABLET, CHEWABLE ORAL DAILY
Refills: 0 | Status: DISCONTINUED | OUTPATIENT
Start: 2023-03-20 | End: 2023-03-22

## 2023-03-20 RX ORDER — ONDANSETRON 8 MG/1
4 TABLET, FILM COATED ORAL EVERY 6 HOURS
Refills: 0 | Status: DISCONTINUED | OUTPATIENT
Start: 2023-03-20 | End: 2023-03-22

## 2023-03-20 RX ORDER — CELECOXIB 200 MG/1
200 CAPSULE ORAL ONCE
Refills: 0 | Status: COMPLETED | OUTPATIENT
Start: 2023-03-20 | End: 2023-03-20

## 2023-03-20 RX ORDER — SENNA PLUS 8.6 MG/1
2 TABLET ORAL AT BEDTIME
Refills: 0 | Status: DISCONTINUED | OUTPATIENT
Start: 2023-03-20 | End: 2023-03-22

## 2023-03-20 RX ORDER — CEFAZOLIN SODIUM 1 G
2000 VIAL (EA) INJECTION EVERY 8 HOURS
Refills: 0 | Status: COMPLETED | OUTPATIENT
Start: 2023-03-21 | End: 2023-03-21

## 2023-03-20 RX ORDER — PANTOPRAZOLE SODIUM 20 MG/1
40 TABLET, DELAYED RELEASE ORAL
Refills: 0 | Status: DISCONTINUED | OUTPATIENT
Start: 2023-03-20 | End: 2023-03-22

## 2023-03-20 RX ADMIN — Medication 1000 MILLIGRAM(S): at 14:45

## 2023-03-20 RX ADMIN — TRAMADOL HYDROCHLORIDE 100 MILLIGRAM(S): 50 TABLET ORAL at 22:42

## 2023-03-20 RX ADMIN — TRAMADOL HYDROCHLORIDE 100 MILLIGRAM(S): 50 TABLET ORAL at 23:30

## 2023-03-20 RX ADMIN — MORPHINE SULFATE 2 MILLIGRAM(S): 50 CAPSULE, EXTENDED RELEASE ORAL at 22:39

## 2023-03-20 RX ADMIN — CELECOXIB 200 MILLIGRAM(S): 200 CAPSULE ORAL at 14:46

## 2023-03-20 RX ADMIN — SENNA PLUS 2 TABLET(S): 8.6 TABLET ORAL at 22:10

## 2023-03-20 RX ADMIN — ONDANSETRON 4 MILLIGRAM(S): 8 TABLET, FILM COATED ORAL at 22:09

## 2023-03-20 RX ADMIN — MORPHINE SULFATE 2 MILLIGRAM(S): 50 CAPSULE, EXTENDED RELEASE ORAL at 22:10

## 2023-03-20 RX ADMIN — CHLORHEXIDINE GLUCONATE 1 APPLICATION(S): 213 SOLUTION TOPICAL at 14:00

## 2023-03-20 NOTE — PROGRESS NOTE ADULT - SUBJECTIVE AND OBJECTIVE BOX
Ortho Post Op Check    Procedure: RIGHT Posterior MIKE   Surgeon: Dr. DARYA Faust    Pt reports mild/moderate pain, would like some pain medication.  Denies CP, SOB, N/V, numbness/tingling     Vital Signs Last 24 Hrs  T(C): 36.4 (03-20-23 @ 21:10), Max: 36.4 (03-20-23 @ 21:10)  T(F): 97.6 (03-20-23 @ 21:10), Max: 97.6 (03-20-23 @ 21:10)  HR: 78 (03-20-23 @ 22:15) (68 - 83)  BP: 125/69 (03-20-23 @ 22:15) (104/61 - 125/69)  BP(mean): 90 (03-20-23 @ 22:15) (77 - 91)  RR: 24 (03-20-23 @ 22:15) (11 - 24)  SpO2: 96% (03-20-23 @ 22:15) (94% - 100%)    General: Pt Alert and oriented, NAD  Aquacel DSG C/D/I; Mitchell Pin site gauze/Tegaderm C/D/I  Pulses: 2+ DP  Sensation: SILT grossly SPN/DPN/Saph/Suha/Tib  Motor: 5/5 TA/GS/EHL, Quad/Psoas firing limited 2/2 pain  Abduction Pillow    Post-op X-Ray: hardware intact w/o fracture    A/P: 62yFemale s/p RIGHT Posterior MIKE by Dr. DARYA Faust on 03-20  - Stable  - Pain Control  - DVT ppx:  BID  - Post op abx: Ancef  - WBS: WBAT, Posterior Hip Precautions, Abduction Pillow  - PT eval/OT eval    Ortho Pager [phone]

## 2023-03-21 LAB
ANION GAP SERPL CALC-SCNC: 12 MMOL/L — SIGNIFICANT CHANGE UP (ref 5–17)
BUN SERPL-MCNC: 8 MG/DL — SIGNIFICANT CHANGE UP (ref 7–23)
CALCIUM SERPL-MCNC: 8.7 MG/DL — SIGNIFICANT CHANGE UP (ref 8.4–10.5)
CHLORIDE SERPL-SCNC: 103 MMOL/L — SIGNIFICANT CHANGE UP (ref 96–108)
CO2 SERPL-SCNC: 24 MMOL/L — SIGNIFICANT CHANGE UP (ref 22–31)
CREAT SERPL-MCNC: 0.54 MG/DL — SIGNIFICANT CHANGE UP (ref 0.5–1.3)
EGFR: 105 ML/MIN/1.73M2 — SIGNIFICANT CHANGE UP
GLUCOSE SERPL-MCNC: 92 MG/DL — SIGNIFICANT CHANGE UP (ref 70–99)
HCT VFR BLD CALC: 33.4 % — LOW (ref 34.5–45)
HGB BLD-MCNC: 11.1 G/DL — LOW (ref 11.5–15.5)
MCHC RBC-ENTMCNC: 30.7 PG — SIGNIFICANT CHANGE UP (ref 27–34)
MCHC RBC-ENTMCNC: 33.2 GM/DL — SIGNIFICANT CHANGE UP (ref 32–36)
MCV RBC AUTO: 92.5 FL — SIGNIFICANT CHANGE UP (ref 80–100)
NRBC # BLD: 0 /100 WBCS — SIGNIFICANT CHANGE UP (ref 0–0)
PLATELET # BLD AUTO: 224 K/UL — SIGNIFICANT CHANGE UP (ref 150–400)
POTASSIUM SERPL-MCNC: 4 MMOL/L — SIGNIFICANT CHANGE UP (ref 3.5–5.3)
POTASSIUM SERPL-SCNC: 4 MMOL/L — SIGNIFICANT CHANGE UP (ref 3.5–5.3)
RBC # BLD: 3.61 M/UL — LOW (ref 3.8–5.2)
RBC # FLD: 13.7 % — SIGNIFICANT CHANGE UP (ref 10.3–14.5)
SODIUM SERPL-SCNC: 139 MMOL/L — SIGNIFICANT CHANGE UP (ref 135–145)
WBC # BLD: 10.54 K/UL — HIGH (ref 3.8–10.5)
WBC # FLD AUTO: 10.54 K/UL — HIGH (ref 3.8–10.5)

## 2023-03-21 RX ORDER — ASPIRIN/CALCIUM CARB/MAGNESIUM 324 MG
1 TABLET ORAL
Qty: 0 | Refills: 0 | DISCHARGE
Start: 2023-03-21

## 2023-03-21 RX ORDER — GABAPENTIN 400 MG/1
1 CAPSULE ORAL
Qty: 0 | Refills: 0 | DISCHARGE
Start: 2023-03-21

## 2023-03-21 RX ORDER — CALCIUM CARBONATE 500(1250)
1 TABLET ORAL THREE TIMES A DAY
Refills: 0 | Status: DISCONTINUED | OUTPATIENT
Start: 2023-03-21 | End: 2023-03-22

## 2023-03-21 RX ORDER — TRAMADOL HYDROCHLORIDE 50 MG/1
2 TABLET ORAL
Qty: 0 | Refills: 0 | DISCHARGE
Start: 2023-03-21

## 2023-03-21 RX ORDER — MELOXICAM 15 MG/1
1 TABLET ORAL
Qty: 10 | Refills: 0
Start: 2023-03-21 | End: 2023-03-30

## 2023-03-21 RX ORDER — DOCUSATE SODIUM 100 MG
1 CAPSULE ORAL
Qty: 10 | Refills: 0
Start: 2023-03-21 | End: 2023-03-30

## 2023-03-21 RX ORDER — LANOLIN ALCOHOL/MO/W.PET/CERES
5 CREAM (GRAM) TOPICAL AT BEDTIME
Refills: 0 | Status: DISCONTINUED | OUTPATIENT
Start: 2023-03-21 | End: 2023-03-22

## 2023-03-21 RX ORDER — POLYETHYLENE GLYCOL 3350 17 G/17G
1 POWDER, FOR SOLUTION ORAL
Qty: 10 | Refills: 0
Start: 2023-03-21 | End: 2023-03-30

## 2023-03-21 RX ORDER — ACETAMINOPHEN 500 MG
3 TABLET ORAL
Qty: 0 | Refills: 0 | DISCHARGE
Start: 2023-03-21

## 2023-03-21 RX ORDER — GABAPENTIN 400 MG/1
1 CAPSULE ORAL
Qty: 0 | Refills: 0 | DISCHARGE

## 2023-03-21 RX ORDER — TRAMADOL HYDROCHLORIDE 50 MG/1
1 TABLET ORAL
Qty: 0 | Refills: 0 | DISCHARGE
Start: 2023-03-21

## 2023-03-21 RX ORDER — CELECOXIB 200 MG/1
1 CAPSULE ORAL
Qty: 0 | Refills: 0 | DISCHARGE
Start: 2023-03-21

## 2023-03-21 RX ORDER — PANTOPRAZOLE SODIUM 20 MG/1
1 TABLET, DELAYED RELEASE ORAL
Qty: 0 | Refills: 0 | DISCHARGE
Start: 2023-03-21

## 2023-03-21 RX ADMIN — Medication 100 MILLIGRAM(S): at 09:53

## 2023-03-21 RX ADMIN — TRAMADOL HYDROCHLORIDE 100 MILLIGRAM(S): 50 TABLET ORAL at 04:40

## 2023-03-21 RX ADMIN — TRAMADOL HYDROCHLORIDE 50 MILLIGRAM(S): 50 TABLET ORAL at 21:23

## 2023-03-21 RX ADMIN — TRAMADOL HYDROCHLORIDE 100 MILLIGRAM(S): 50 TABLET ORAL at 11:42

## 2023-03-21 RX ADMIN — TRAMADOL HYDROCHLORIDE 100 MILLIGRAM(S): 50 TABLET ORAL at 10:42

## 2023-03-21 RX ADMIN — Medication 100 MILLIGRAM(S): at 00:56

## 2023-03-21 RX ADMIN — TRAMADOL HYDROCHLORIDE 50 MILLIGRAM(S): 50 TABLET ORAL at 22:23

## 2023-03-21 RX ADMIN — TRAMADOL HYDROCHLORIDE 100 MILLIGRAM(S): 50 TABLET ORAL at 05:40

## 2023-03-21 NOTE — PHYSICAL THERAPY INITIAL EVALUATION ADULT - ADDITIONAL COMMENTS
Patient lives with her sister in an elevator building. As per patient her sister helped her with everything. Ambulated with cane or RW PTA and at times used a w/c. Has commode and hip kit at home.

## 2023-03-21 NOTE — PATIENT PROFILE ADULT - FALL HARM RISK - HARM RISK INTERVENTIONS

## 2023-03-21 NOTE — OCCUPATIONAL THERAPY INITIAL EVALUATION ADULT - MODIFIED CLINICAL TEST OF SENSORY INTEGRATION IN BALANCE TEST
Pt. engaged in functional mob in room and hallway with RW and Min A, no LOB noted, R step length and foot clearance limited 2/2 pain

## 2023-03-21 NOTE — OCCUPATIONAL THERAPY INITIAL EVALUATION ADULT - PERTINENT HX OF CURRENT PROBLEM, REHAB EVAL
63yo F w/ right hip pain x chronic. Patient notes stiffness, limited ROM, pain with ambulation at the right hip. Endorses failure of conservative management including oral analgesics, activity modification. Patient underwent left MIKE in November 2022.

## 2023-03-21 NOTE — PHYSICAL THERAPY INITIAL EVALUATION ADULT - BED MOBILITY LIMITATIONS, REHAB EVAL
decreased ability to use arms for pushing/pulling/decreased ability to use legs for bridging/pushing none

## 2023-03-21 NOTE — PROGRESS NOTE ADULT - SUBJECTIVE AND OBJECTIVE BOX
Ortho Note    Pt comfortable without complaints, pain controlled  Denies CP, SOB, N/V, new numbness/tingling       T(C): 36.4 (20 Mar 2023 21:10), Max: 36.6 (20 Mar 2023 15:22)  T(F): 97.6 (20 Mar 2023 21:10), Max: 97.6 (20 Mar 2023 11:30)  HR: 84 (20 Mar 2023 22:45) (68 - 85)  BP: 114/63 (20 Mar 2023 22:45) (101/64 - 125/69)  BP(mean): 83 (20 Mar 2023 22:45) (77 - 91)  ABP: --  ABP(mean): --  RR: 18 (20 Mar 2023 22:45) (11 - 24)  SpO2: 95% (20 Mar 2023 22:45) (94% - 100%)    O2 Parameters below as of 20 Mar 2023 22:45  Patient On (Oxygen Delivery Method): room air            20 Mar 2023 07:01  -  21 Mar 2023 06:56  --------------------------------------------------------  IN: 360 mL / OUT: 200 mL / NET: 160 mL      General: Pt Alert and oriented, NAD  Aquacel DSG C/D/I; gauze/Tegaderm C/D/I  Pulses: 2+ DP  Sensation: SILT grossly SPN/DPN/Saph/Suha/Tib  Motor: 5/5 TA/GS/EHL, Quad/Psoas firing limited 2/2 pain  Abduction Pillow        A/P: 62yFemale s/p R MIKE w/ Dr. Faust 3/21  - Stable  - Pain Control  - DVT ppx: asa 325 bid  - PT, WBS: RLE wbat, posterior hip precautions  -Pending Dr. Christianne tidwell this AM and PT evaluation    Ortho Pager [phone]

## 2023-03-21 NOTE — DISCHARGE NOTE PROVIDER - NSDCCPCAREPLAN_GEN_ALL_CORE_FT
PRINCIPAL DISCHARGE DIAGNOSIS  Diagnosis: Osteoarthritis of right hip  Assessment and Plan of Treatment: s/p R MIKE      SECONDARY DISCHARGE DIAGNOSES  Diagnosis: Osteoarthritis of right hip  Assessment and Plan of Treatment:

## 2023-03-21 NOTE — OCCUPATIONAL THERAPY INITIAL EVALUATION ADULT - DIAGNOSIS, OT EVAL
Pt. admitted to [facility] for elective R THR procedure. Pt. currently with posterior hip precautions. Upon assessment, pt. presents w. slight impairments in balance and activity tolerance, as well as increased pain (~8 throughout session).

## 2023-03-21 NOTE — PHYSICAL THERAPY INITIAL EVALUATION ADULT - PERTINENT HX OF CURRENT PROBLEM, REHAB EVAL
61yo F w/ right hip pain x chronic. Patient notes stiffness, limited ROM, pain with ambulation at the right hip. Endorses failure of conservative management including oral analgesics, activity modification. Patient underwent left MIKE in November 2022.

## 2023-03-21 NOTE — PHYSICAL THERAPY INITIAL EVALUATION ADULT - ACTIVE RANGE OF MOTION EXAMINATION, REHAB EVAL
right hip/knee NT due tto surgery. Right ankle WNL/magalie. upper extremity Active ROM was WNL (within normal limits)/Left LE Active ROM was WFL (within functional limits)

## 2023-03-21 NOTE — PHYSICAL THERAPY INITIAL EVALUATION ADULT - GROSSLY INTACT, SENSORY
I called and spoke w pt  Pt stated that he lives in [place] and has a cardiologist there.   Pt said that Dr Johnson referred him to us for A-fib.     Pt stated that he had an EKG on 03/14/2022 at Dr Johnson's office and it showed that he was in a-fib. Pt is currently taking Sotalol 80mg daily. Pt said that he has had 2-3 cardioversions in the past.   Pt denies chest pain or palpitations. Pt said that he does get SOB when he walks up hills around his neighborhood.   Pt said that he will be going back to [place] in May and wanted to follow up with a cardiologist here to make sure everything is ok.    Please advise for an apt  
I spoke w/ pt and scheduled him to be seen by Dr. Aguilar on 04/06 @ 3:00PM. Pt was in breakthrough AFIB, but HR was still WNL and pt is on Sotalol and Coumadin presently.   
Patient saw dr collazo and would like to follow up with dr pickett did not want to wait looking for the next few weeks   
Per our conversation lets put him in on April 6.  
Left UE/Right UE/Left LE/Right LE/Grossly Intact

## 2023-03-21 NOTE — DISCHARGE NOTE PROVIDER - NSDCCPTREATMENT_GEN_ALL_CORE_FT
PRINCIPAL PROCEDURE  Procedure: Right total hip replacement  Findings and Treatment: osteoarthritis of the right hip

## 2023-03-21 NOTE — OCCUPATIONAL THERAPY INITIAL EVALUATION ADULT - GENERAL OBSERVATIONS, REHAB EVAL
DEYANIRA abraham pt. for OOB. Pt. received semi-supine in bed,+IV + L SCD in NAD agreeable to therapy session. PT Emili arriola.

## 2023-03-21 NOTE — PHYSICAL THERAPY INITIAL EVALUATION ADULT - GENERAL OBSERVATIONS, REHAB EVAL
As per RN Lakeisha patient cleared for PT/OOB. Received supine + IV, dressing right hip C,D,I, SCD left leg (requested SCD right leg from RN), in NAD

## 2023-03-21 NOTE — DISCHARGE NOTE PROVIDER - CARE PROVIDER_API CALL
Jarocho Faust)  Orthopaedic Surgery  [address] [address]  Phone: [phone]  Fax: [phone]  Follow Up Time: 2 months

## 2023-03-21 NOTE — OCCUPATIONAL THERAPY INITIAL EVALUATION ADULT - PLANNED THERAPY INTERVENTIONS, OT EVAL
No - the patient is unable to be screened due to medical condition
ADL retraining/balance training/bed mobility training/neuromuscular re-education/transfer training

## 2023-03-21 NOTE — PATIENT PROFILE ADULT - FUNCTIONAL ASSESSMENT - BASIC MOBILITY 6.
2-calculated by average/Not able to assess (calculate score using [facility] averaging method)

## 2023-03-21 NOTE — DISCHARGE NOTE PROVIDER - NSDCMRMEDTOKEN_GEN_ALL_CORE_FT
acetaminophen 325 mg oral tablet: 3 tab(s) orally every 8 hours  aspirin 325 mg oral tablet: 1 tab(s) orally 2 times a day  Colace 100 mg oral capsule: 1 cap(s) orally once a day MDD:1  gabapentin 300 mg oral capsule: 1 cap(s) orally every 12 hours  meloxicam 15 mg oral tablet: 1 tab(s) orally once a day, As Needed MDD:1   MiraLax oral powder for reconstitution: 1 gram(s) orally once a day, As Needed MDD:1  Multiple Vitamins oral tablet: 1 tab(s) orally once a day  pantoprazole 40 mg oral delayed release tablet: 1 tab(s) orally once a day (before a meal)  traMADol 50 mg oral tablet: 1 tab(s) orally every 6 hours, As needed, Moderate Pain (4 - 6)  traMADol 50 mg oral tablet: 2 tab(s) orally every 6 hours, As needed, Severe Pain (7 - 10)   acetaminophen 325 mg oral tablet: 3 tab(s) orally every 8 hours  aspirin 325 mg oral tablet: 1 tab(s) orally 2 times a day  Colace 100 mg oral capsule: 1 cap(s) orally once a day MDD:1  gabapentin 300 mg oral capsule: 1 cap(s) orally every 12 hours  meloxicam 15 mg oral tablet: 1 tab(s) orally once a day, As Needed MDD:1   MiraLax oral powder for reconstitution: 1 gram(s) orally once a day, As Needed MDD:1  Multiple Vitamins oral tablet: 1 tab(s) orally once a day  pantoprazole 40 mg oral delayed release tablet: 1 tab(s) orally once a day (before a meal)  traMADol 50 mg oral tablet: 1 tab(s) orally every 6 hours, As needed, Moderate Pain (4 - 6)

## 2023-03-21 NOTE — DISCHARGE NOTE PROVIDER - NSDCFUADDINST_GEN_ALL_CORE_FT
ACTIVITY:   - Weight bear as tolerated with assistive device. No strenuous activity, heavy lifting, driving or returning to work until cleared by MD.   - Apply a cold compress to the surgical site several times daily to reduce pain and swelling. For icing, twenty-minute sessions followed by an hour off is recommended. You should ice as frequently as possible. Ice should NEVER be placed directly on the skin. Wearing compression stockings during the first week after surgery can help reduce swelling in your knee, calf and foot, but is not required.      (POSTERIOR HIP REPLACEMENTS)   Hip precautions:   The following precautions will remain in effect for 6 weeks following surgery:   · Do not cross your knees.   · Do not flex your hip (i.e., bring your knee toward your chest) beyond 90 degrees.   · Use a raised toilet seat. Do not use low chairs or couches.   · Sleep with a pillow between your knees.   · Do not reach down to  items on the floor.       DRESSING/SHOWERING:   (AQUACEL – brown gel dressing)   - You may shower, your dressing is water-resistant. Do not soak in bathtubs. Remove dressing after postop day 7, then leave incision open to air. You may do this yourself (simply peel it off), or you may ask for assistance from your visiting nurse. Keep your incision clean and dry. Do not pick at your incision. Do not apply creams, ointments or oils to your incision until cleared by your surgeon. Do not soak your incision in sitting water (ie tubs, pools, lakes, etc.) until cleared by your surgeon. Do not scrub the incision – instead, allow soap and water to flow over the incision and then pat it dry with a clean towel.       MEDICATION/ANTICOAGULATION:   -You have been prescribed aspirin , as a preventative to help prevent postoperative blood clots. Please take this medication as prescribed.    - You have been prescribed medications for pain:     - Tylenol for mild to moderate pain. Do not exceed 3,000mg daily.     - For more severe pain, take Tylenol with the addition of narcotic pain medication. Take this medication as prescribed. This medication may cause drowsiness or dizziness. Do not operate machinery. This medication may cause constipation.   - For any additional medications, follow instructions on the bottle.    -Try to have regular bowel movements. Take stool softener or laxative if necessary. You may wish to take Miralax daily until you have regular bowel movements.    - If you have been prescribed Aspirin or an anti-inflammatory, please take prilosec (omeprazole) once a day, before breakfast, until no longer taking Aspirin or anti-inflammatory. This will help protect your stomach.   - If you have a pain management physician, please follow-up with them postoperatively.    - If you experience any negative side effects of your medications, please call your surgeon's office to discuss.      FOLLOW-UP:   - Call to schedule an appt with Dr. FAUST for follow up between 4-3 and 4-7  - Please follow-up with your primary care physician or any other specialist you see postoperatively, if needed.    - Contact your doctor or go to the emergency room if you experience: fever greater than 101.5, chills, chest pain, difficulty breathing, redness or excessive drainage around the incision, other concerns.  Your medications were prescribed to you by Dr. Faust's office.  ACTIVITY:   - Weight bear as tolerated with assistive device. No strenuous activity, heavy lifting, driving or returning to work until cleared by MD.   - Apply a cold compress to the surgical site several times daily to reduce pain and swelling. For icing, twenty-minute sessions followed by an hour off is recommended. You should ice as frequently as possible. Ice should NEVER be placed directly on the skin. Wearing compression stockings during the first week after surgery can help reduce swelling in your knee, calf and foot, but is not required.      (POSTERIOR HIP REPLACEMENTS)   Hip precautions:   The following precautions will remain in effect for 6 weeks following surgery:   · Do not cross your knees.   · Do not flex your hip (i.e., bring your knee toward your chest) beyond 90 degrees.   · Use a raised toilet seat. Do not use low chairs or couches.   · Sleep with a pillow between your knees.   · Do not reach down to  items on the floor.       DRESSING/SHOWERING:   (AQUACEL – brown gel dressing)   - You may shower, your dressing is water-resistant. Do not soak in bathtubs. Remove dressing after postop day 7, then leave incision open to air. You may do this yourself (simply peel it off), or you may ask for assistance from your visiting nurse. Keep your incision clean and dry. Do not pick at your incision. Do not apply creams, ointments or oils to your incision until cleared by your surgeon. Do not soak your incision in sitting water (ie tubs, pools, lakes, etc.) until cleared by your surgeon. Do not scrub the incision – instead, allow soap and water to flow over the incision and then pat it dry with a clean towel.       MEDICATION/ANTICOAGULATION:   -You have been prescribed aspirin 325mg oral, twice daily x 6 weeks , as a preventative to help prevent postoperative blood clots. Please take this medication as prescribed.    - You have been prescribed medications for pain:     - Tylenol for mild to moderate pain. Do not exceed 3,000mg daily.     - For more severe pain, take Tylenol with the addition of narcotic pain medication. Take this medication as prescribed. This medication may cause drowsiness or dizziness. Do not operate machinery. This medication may cause constipation.   - For any additional medications, follow instructions on the bottle.    -Try to have regular bowel movements. Take stool softener or laxative if necessary. You may wish to take Miralax daily until you have regular bowel movements.    - If you have been prescribed Aspirin or an anti-inflammatory, please take protonix (pantoprazole) once a day, before breakfast, until no longer taking Aspirin or anti-inflammatory. This will help protect your stomach.   - If you have a pain management physician, please follow-up with them postoperatively.    - If you experience any negative side effects of your medications, please call your surgeon's office to discuss.      FOLLOW-UP:   - Call to schedule an appt with Dr. Faust for follow up between 4-3 and 4-7  - Please follow-up with your primary care physician or any other specialist you see postoperatively, if needed.    - Contact your doctor or go to the emergency room if you experience: fever greater than 101.5, chills, chest pain, difficulty breathing, redness or excessive drainage around the incision, other concerns.  Your medications were prescribed to you by Dr. Faust's office.

## 2023-03-21 NOTE — OCCUPATIONAL THERAPY INITIAL EVALUATION ADULT - RANGE OF MOTION EXAMINATION, LOWER EXTREMITY
Right LE Active ROM was WNL(within normal limits)/Right LE Passive ROM was WNL (within normal limits)

## 2023-03-21 NOTE — OCCUPATIONAL THERAPY INITIAL EVALUATION ADULT - MODALITIES TREATMENT COMMENTS
Pt. reporting prior use of hip kit for LB dressing and reports assist available from sister to safely perform Pt. reporting prior use of hip kit for LB dressing. Of note, pt. demo fair (-) carryover of information for posterior hip precautions throughout session

## 2023-03-21 NOTE — PROGRESS NOTE ADULT - SUBJECTIVE AND OBJECTIVE BOX
Ortho Note    Subjective:  Pt comfortable without complaints, pain controlled with current pain medication regimen   Denies CP, SOB, N/V, numbness/tingling   Reviewed plan of care with patient at bedside    Vital Signs Last 24 Hrs  T(C): 36.6 (03-21-23 @ 08:34), Max: 36.6 (03-21-23 @ 08:34)  T(F): 97.9 (03-21-23 @ 08:34), Max: 97.9 (03-21-23 @ 08:34)  HR: 98 (03-21-23 @ 08:34) (79 - 98)  BP: 108/74 (03-21-23 @ 08:34) (102/64 - 108/74)  BP(mean): --  RR: 18 (03-21-23 @ 08:34) (16 - 18)  SpO2: 96% (03-21-23 @ 08:34) (94% - 96%)  AVSS    Objective:    Physical Exam:  General: Pt Alert and oriented, NAD  Right hip DSG C/D/I  Pulses: +2 pedal pulses, wwp toes  Sensation: silt intact bilateral lower extremities  Motor: EHL/FHL/TA/GS- 5/5 bilateral lower extremities        Plan of Care:  A/P: 62yFemale POD#1 s/p R MIKE  - afebrile  - Pain Control- tylenol 975mg PO Q8h, celebrex 200mg PO daily, gabapentin 300mg PO Q12h , morphine 2mg IVP Q4h prn breakthrough pain, tramadol 50-100mg PO Q6h prn moderate to severe pain, toradol 15mg IV Q8h, x2 doses,   - DVT ppx: aspirin 325mg PO BID  - PT, WBS: WBAT  - ambulate with PT as tolerated  - appreciate medicine recs  - f/u am labs   - bowel regimen, IS use, PPI  - Dispo- home pending pt clearance    Ortho Pager [phone]

## 2023-03-21 NOTE — OCCUPATIONAL THERAPY INITIAL EVALUATION ADULT - ADDITIONAL COMMENTS
Pt. reports living in a ground floor apartment, about [address] with her sister. Pt. reports receiving assist from sister when necessary for ADLs ( harpreet after last L THR about 3 months ago), and utilizes AD for functional mob independently. She owns a w/c, walker, cane, shower chair, commode, and hip kit

## 2023-03-21 NOTE — DISCHARGE NOTE PROVIDER - HOSPITAL COURSE
Admitted: 3-  Surgery: s/p R MIKE  Arely-op Antibiotics:  Pain control  DVT prophylaxis:  OOB/Physical Therapy  Consultants:  Inpatient events: Admitted: 3-  Surgery: s/p R MIKE  Arely-op Antibiotics:  Pain control  DVT prophylaxis: Aspirin 325mg PO BID x 6 wks  OOB/Physical Therapy: Home PT

## 2023-03-22 VITALS
SYSTOLIC BLOOD PRESSURE: 110 MMHG | OXYGEN SATURATION: 95 % | RESPIRATION RATE: 18 BRPM | HEART RATE: 99 BPM | DIASTOLIC BLOOD PRESSURE: 74 MMHG

## 2023-03-22 LAB
ANION GAP SERPL CALC-SCNC: 14 MMOL/L — SIGNIFICANT CHANGE UP (ref 5–17)
ANION GAP SERPL CALC-SCNC: 8 MMOL/L — SIGNIFICANT CHANGE UP (ref 5–17)
BUN SERPL-MCNC: 6 MG/DL — LOW (ref 7–23)
BUN SERPL-MCNC: 7 MG/DL — SIGNIFICANT CHANGE UP (ref 7–23)
CALCIUM SERPL-MCNC: 8.5 MG/DL — SIGNIFICANT CHANGE UP (ref 8.4–10.5)
CALCIUM SERPL-MCNC: 8.8 MG/DL — SIGNIFICANT CHANGE UP (ref 8.4–10.5)
CHLORIDE SERPL-SCNC: 103 MMOL/L — SIGNIFICANT CHANGE UP (ref 96–108)
CHLORIDE SERPL-SCNC: 105 MMOL/L — SIGNIFICANT CHANGE UP (ref 96–108)
CO2 SERPL-SCNC: 19 MMOL/L — LOW (ref 22–31)
CO2 SERPL-SCNC: 24 MMOL/L — SIGNIFICANT CHANGE UP (ref 22–31)
CREAT SERPL-MCNC: 0.5 MG/DL — SIGNIFICANT CHANGE UP (ref 0.5–1.3)
CREAT SERPL-MCNC: 0.5 MG/DL — SIGNIFICANT CHANGE UP (ref 0.5–1.3)
EGFR: 107 ML/MIN/1.73M2 — SIGNIFICANT CHANGE UP
EGFR: 107 ML/MIN/1.73M2 — SIGNIFICANT CHANGE UP
GLUCOSE SERPL-MCNC: 122 MG/DL — HIGH (ref 70–99)
GLUCOSE SERPL-MCNC: 92 MG/DL — SIGNIFICANT CHANGE UP (ref 70–99)
HCT VFR BLD CALC: 32.1 % — LOW (ref 34.5–45)
HCT VFR BLD CALC: 34.3 % — LOW (ref 34.5–45)
HGB BLD-MCNC: 10.7 G/DL — LOW (ref 11.5–15.5)
HGB BLD-MCNC: 11.3 G/DL — LOW (ref 11.5–15.5)
MCHC RBC-ENTMCNC: 30.5 PG — SIGNIFICANT CHANGE UP (ref 27–34)
MCHC RBC-ENTMCNC: 30.7 PG — SIGNIFICANT CHANGE UP (ref 27–34)
MCHC RBC-ENTMCNC: 32.9 GM/DL — SIGNIFICANT CHANGE UP (ref 32–36)
MCHC RBC-ENTMCNC: 33.3 GM/DL — SIGNIFICANT CHANGE UP (ref 32–36)
MCV RBC AUTO: 92.2 FL — SIGNIFICANT CHANGE UP (ref 80–100)
MCV RBC AUTO: 92.5 FL — SIGNIFICANT CHANGE UP (ref 80–100)
NRBC # BLD: 0 /100 WBCS — SIGNIFICANT CHANGE UP (ref 0–0)
NRBC # BLD: 0 /100 WBCS — SIGNIFICANT CHANGE UP (ref 0–0)
PLATELET # BLD AUTO: 204 K/UL — SIGNIFICANT CHANGE UP (ref 150–400)
PLATELET # BLD AUTO: 221 K/UL — SIGNIFICANT CHANGE UP (ref 150–400)
POTASSIUM SERPL-MCNC: 3.6 MMOL/L — SIGNIFICANT CHANGE UP (ref 3.5–5.3)
POTASSIUM SERPL-MCNC: 3.9 MMOL/L — SIGNIFICANT CHANGE UP (ref 3.5–5.3)
POTASSIUM SERPL-SCNC: 3.6 MMOL/L — SIGNIFICANT CHANGE UP (ref 3.5–5.3)
POTASSIUM SERPL-SCNC: 3.9 MMOL/L — SIGNIFICANT CHANGE UP (ref 3.5–5.3)
RBC # BLD: 3.48 M/UL — LOW (ref 3.8–5.2)
RBC # BLD: 3.71 M/UL — LOW (ref 3.8–5.2)
RBC # FLD: 13.7 % — SIGNIFICANT CHANGE UP (ref 10.3–14.5)
RBC # FLD: 13.8 % — SIGNIFICANT CHANGE UP (ref 10.3–14.5)
SODIUM SERPL-SCNC: 136 MMOL/L — SIGNIFICANT CHANGE UP (ref 135–145)
SODIUM SERPL-SCNC: 137 MMOL/L — SIGNIFICANT CHANGE UP (ref 135–145)
WBC # BLD: 8.76 K/UL — SIGNIFICANT CHANGE UP (ref 3.8–10.5)
WBC # BLD: 8.95 K/UL — SIGNIFICANT CHANGE UP (ref 3.8–10.5)
WBC # FLD AUTO: 8.76 K/UL — SIGNIFICANT CHANGE UP (ref 3.8–10.5)
WBC # FLD AUTO: 8.95 K/UL — SIGNIFICANT CHANGE UP (ref 3.8–10.5)

## 2023-03-22 PROCEDURE — 97161 PT EVAL LOW COMPLEX 20 MIN: CPT

## 2023-03-22 PROCEDURE — 72170 X-RAY EXAM OF PELVIS: CPT

## 2023-03-22 PROCEDURE — S2900: CPT

## 2023-03-22 PROCEDURE — C1713: CPT

## 2023-03-22 PROCEDURE — 97165 OT EVAL LOW COMPLEX 30 MIN: CPT

## 2023-03-22 PROCEDURE — 80048 BASIC METABOLIC PNL TOTAL CA: CPT

## 2023-03-22 PROCEDURE — C1776: CPT

## 2023-03-22 PROCEDURE — 88300 SURGICAL PATH GROSS: CPT

## 2023-03-22 PROCEDURE — C1889: CPT

## 2023-03-22 PROCEDURE — 97116 GAIT TRAINING THERAPY: CPT

## 2023-03-22 PROCEDURE — 36415 COLL VENOUS BLD VENIPUNCTURE: CPT

## 2023-03-22 PROCEDURE — 85027 COMPLETE CBC AUTOMATED: CPT

## 2023-03-22 RX ORDER — MORPHINE SULFATE 50 MG/1
1 CAPSULE, EXTENDED RELEASE ORAL EVERY 4 HOURS
Refills: 0 | Status: DISCONTINUED | OUTPATIENT
Start: 2023-03-22 | End: 2023-03-22

## 2023-03-22 RX ORDER — TRAMADOL HYDROCHLORIDE 50 MG/1
50 TABLET ORAL EVERY 6 HOURS
Refills: 0 | Status: DISCONTINUED | OUTPATIENT
Start: 2023-03-22 | End: 2023-03-22

## 2023-03-22 RX ORDER — TRAMADOL HYDROCHLORIDE 50 MG/1
25 TABLET ORAL EVERY 6 HOURS
Refills: 0 | Status: DISCONTINUED | OUTPATIENT
Start: 2023-03-22 | End: 2023-03-22

## 2023-03-22 RX ADMIN — Medication 325 MILLIGRAM(S): at 07:36

## 2023-03-22 RX ADMIN — CELECOXIB 200 MILLIGRAM(S): 200 CAPSULE ORAL at 14:30

## 2023-03-22 RX ADMIN — Medication 975 MILLIGRAM(S): at 07:36

## 2023-03-22 RX ADMIN — Medication 1 TABLET(S): at 13:31

## 2023-03-22 RX ADMIN — Medication 975 MILLIGRAM(S): at 14:32

## 2023-03-22 RX ADMIN — CELECOXIB 200 MILLIGRAM(S): 200 CAPSULE ORAL at 13:30

## 2023-03-22 RX ADMIN — Medication 975 MILLIGRAM(S): at 13:32

## 2023-03-22 NOTE — DISCHARGE NOTE NURSING/CASE MANAGEMENT/SOCIAL WORK - PATIENT PORTAL LINK FT
You can access the FollowMyHealth Patient Portal offered by [facility] by registering at the following website: [email or web address]. By joining [facility]’s FollowMyHealth portal, you will also be able to view your health information using other applications (apps) compatible with our system.

## 2023-03-27 DIAGNOSIS — K90.41 NON-CELIAC GLUTEN SENSITIVITY: ICD-10-CM

## 2023-03-27 DIAGNOSIS — Z85.42 PERSONAL HISTORY OF MALIGNANT NEOPLASM OF OTHER PARTS OF UTERUS: ICD-10-CM

## 2023-03-27 DIAGNOSIS — R42 DIZZINESS AND GIDDINESS: ICD-10-CM

## 2023-03-27 DIAGNOSIS — Z91.041 RADIOGRAPHIC DYE ALLERGY STATUS: ICD-10-CM

## 2023-03-27 DIAGNOSIS — M16.11 UNILATERAL PRIMARY OSTEOARTHRITIS, RIGHT HIP: ICD-10-CM

## 2023-03-27 DIAGNOSIS — F17.200 NICOTINE DEPENDENCE, UNSPECIFIED, UNCOMPLICATED: ICD-10-CM

## 2023-03-27 DIAGNOSIS — Z86.16 PERSONAL HISTORY OF COVID-19: ICD-10-CM

## 2023-03-27 DIAGNOSIS — Z96.642 PRESENCE OF LEFT ARTIFICIAL HIP JOINT: ICD-10-CM

## 2023-03-27 DIAGNOSIS — G62.9 POLYNEUROPATHY, UNSPECIFIED: ICD-10-CM

## 2023-03-27 DIAGNOSIS — Z53.29 PROCEDURE AND TREATMENT NOT CARRIED OUT BECAUSE OF PATIENT'S DECISION FOR OTHER REASONS: ICD-10-CM

## 2023-04-07 LAB — SURGICAL PATHOLOGY STUDY: SIGNIFICANT CHANGE UP

## 2023-07-10 NOTE — PROGRESS NOTE ADULT - SUBJECTIVE AND OBJECTIVE BOX
Ortho Note    Pt comfortable without complaints, pain controlled  Denies CP, SOB, N/V, new numbness/tingling     T(C): 36.4 (20 Mar 2023 21:10), Max: 36.6 (20 Mar 2023 15:22)  T(F): 97.6 (20 Mar 2023 21:10), Max: 97.6 (20 Mar 2023 11:30)  HR: 84 (20 Mar 2023 22:45) (68 - 85)  BP: 114/63 (20 Mar 2023 22:45) (101/64 - 125/69)  BP(mean): 83 (20 Mar 2023 22:45) (77 - 91)  ABP: --  ABP(mean): --  RR: 18 (20 Mar 2023 22:45) (11 - 24)  SpO2: 95% (20 Mar 2023 22:45) (94% - 100%)    O2 Parameters below as of 20 Mar 2023 22:45  Patient On (Oxygen Delivery Method): room air    20 Mar 2023 07:01  -  21 Mar 2023 07:00  --------------------------------------------------------  IN: 360 mL / OUT: 200 mL / NET: 160 mL    21 Mar 2023 07:01  -  22 Mar 2023 04:57  --------------------------------------------------------  IN: 1800 mL / OUT: 450 mL / NET: 1350 mL        General: Pt Alert and oriented, NAD  Aquacel DSG C/D/I; gauze/Tegaderm C/D/I  Pulses: 2+ DP  Sensation: SILT grossly SPN/DPN/Saph/Suha/Tib  Motor: 5/5 TA/GS/EHL, Quad/Psoas firing limited 2/2 pain  Abduction Pillow present but inconsistent use by pt                          11.1   10.54 )-----------( 224      ( 21 Mar 2023 16:07 )             33.4     03-21    139  |  103  |  8   ----------------------------<  92  4.0   |  24  |  0.54    Ca    8.7      21 Mar 2023 16:07      A/P: 62yFemale s/p MITA MCKEON w/ Dr. Faust 3/21  - Stable  - Pain Control  - DVT ppx: asa 325 bid  - PT, WBS: RLE wbat, posterior hip precautions  -Pending PT clearance  Ortho Pager [phone] Ortho Note    Pt comfortable without complaints, pain controlled. pt continues to refuse meds  Denies CP, SOB, N/V, new numbness/tingling     T(C): 36.4 (20 Mar 2023 21:10), Max: 36.6 (20 Mar 2023 15:22)  T(F): 97.6 (20 Mar 2023 21:10), Max: 97.6 (20 Mar 2023 11:30)  HR: 84 (20 Mar 2023 22:45) (68 - 85)  BP: 114/63 (20 Mar 2023 22:45) (101/64 - 125/69)  BP(mean): 83 (20 Mar 2023 22:45) (77 - 91)  ABP: --  ABP(mean): --  RR: 18 (20 Mar 2023 22:45) (11 - 24)  SpO2: 95% (20 Mar 2023 22:45) (94% - 100%)    O2 Parameters below as of 20 Mar 2023 22:45  Patient On (Oxygen Delivery Method): room air    20 Mar 2023 07:01  -  21 Mar 2023 07:00  --------------------------------------------------------  IN: 360 mL / OUT: 200 mL / NET: 160 mL    21 Mar 2023 07:01  -  22 Mar 2023 04:57  --------------------------------------------------------  IN: 1800 mL / OUT: 450 mL / NET: 1350 mL        General: Pt Alert and oriented, NAD  Aquacel DSG C/D/I; gauze/Tegaderm C/D/I  Pulses: 2+ DP  Sensation: SILT grossly SPN/DPN/Saph/Suha/Tib  Motor: 5/5 TA/GS/EHL, Quad/Psoas firing limited 2/2 pain  Abduction Pillow present but inconsistent use by pt                          11.1   10.54 )-----------( 224      ( 21 Mar 2023 16:07 )             33.4     03-21    139  |  103  |  8   ----------------------------<  92  4.0   |  24  |  0.54    Ca    8.7      21 Mar 2023 16:07      A/P: 62yFemale s/p MITA MCKEON w/ Dr. Faust 3/21  - Stable  - Pain Control  - DVT ppx: asa 325 bid  - PT, WBS: RLE wbat, posterior hip precautions  -Pending PT clearance  Ortho Pager [phone] Ortho Note    Pt comfortable without complaints, pain controlled. pt continues to refuse meds  Denies CP, SOB, N/V, new numbness/tingling     T(C): 36.4 (20 Mar 2023 21:10), Max: 36.6 (20 Mar 2023 15:22)  T(F): 97.6 (20 Mar 2023 21:10), Max: 97.6 (20 Mar 2023 11:30)  HR: 84 (20 Mar 2023 22:45) (68 - 85)  BP: 114/63 (20 Mar 2023 22:45) (101/64 - 125/69)  BP(mean): 83 (20 Mar 2023 22:45) (77 - 91)  ABP: --  ABP(mean): --  RR: 18 (20 Mar 2023 22:45) (11 - 24)  SpO2: 95% (20 Mar 2023 22:45) (94% - 100%)    O2 Parameters below as of 20 Mar 2023 22:45  Patient On (Oxygen Delivery Method): room air    20 Mar 2023 07:01  -  21 Mar 2023 07:00  --------------------------------------------------------  IN: 360 mL / OUT: 200 mL / NET: 160 mL    21 Mar 2023 07:01  -  22 Mar 2023 04:57  --------------------------------------------------------  IN: 1800 mL / OUT: 450 mL / NET: 1350 mL        General: Pt Alert and oriented, NAD  Aquacel DSG C/D/I; gauze/Tegaderm C/D/I  Refusing exam this AM                          11.1   10.54 )-----------( 224      ( 21 Mar 2023 16:07 )             33.4     03-21    139  |  103  |  8   ----------------------------<  92  4.0   |  24  |  0.54    Ca    8.7      21 Mar 2023 16:07      A/P: 62yFemale s/p R MIKE w/ Dr. Faust 3/21  - Stable  - Pain Control  - DVT ppx: asa 325 bid  - PT, WBS: RLE wbat, posterior hip precautions  -Pending PT clearance  Ortho Pager [phone] Intact family/Strong support system/Supportive family

## 2025-05-14 NOTE — OCCUPATIONAL THERAPY INITIAL EVALUATION ADULT - FINE MOTOR COORDINATION, RIGHT HAND, MANIPULATE OBJECTS, OT EVAL
Diabetic control has been good with great hemoglobin A1c as of last November.  Renal function appears to be down a bit from her last measure with GFR at 27 which would place her in stage IV if this is persistent.  Follows under specialty care.   normal performance

## (undated) DEVICE — DRAPE LIGHT HANDLE COVER (GREEN)

## (undated) DEVICE — PACK TOTAL HIP

## (undated) DEVICE — SUT VICRYL 2-0 27" CT-1 UNDYED

## (undated) DEVICE — SUT ETHILON 3-0 18" PS-1

## (undated) DEVICE — PACK ANTERIOR HIP ACSRY LNX SURGICOUNT

## (undated) DEVICE — MAKO VIZADISC HIP PROCEDURE TRACKING KIT

## (undated) DEVICE — MAKO DRAPE KIT

## (undated) DEVICE — DRAPE LIGHT HANDLE COVER (BLUE)

## (undated) DEVICE — DRSG AQUACEL 3.5 X 10"

## (undated) DEVICE — SUT ETHIBOND 1 30" OS8

## (undated) DEVICE — SUT STRATAFIX SPIRAL PDO 1 30CM OS-6

## (undated) DEVICE — SOL IRR BAG NS 0.9% 3000ML

## (undated) DEVICE — Device

## (undated) DEVICE — SAW BLADE STRYKER RECIPROCATING 77.6X0.77X11.2MM